# Patient Record
Sex: FEMALE
[De-identification: names, ages, dates, MRNs, and addresses within clinical notes are randomized per-mention and may not be internally consistent; named-entity substitution may affect disease eponyms.]

---

## 2018-11-13 PROBLEM — Z00.00 ENCOUNTER FOR PREVENTIVE HEALTH EXAMINATION: Status: ACTIVE | Noted: 2018-11-13

## 2018-11-23 ENCOUNTER — APPOINTMENT (OUTPATIENT)
Dept: ORTHOPEDIC SURGERY | Facility: CLINIC | Age: 48
End: 2018-11-23
Payer: OTHER MISCELLANEOUS

## 2018-11-23 VITALS
SYSTOLIC BLOOD PRESSURE: 115 MMHG | WEIGHT: 95 LBS | BODY MASS INDEX: 18.65 KG/M2 | HEART RATE: 81 BPM | HEIGHT: 60 IN | DIASTOLIC BLOOD PRESSURE: 82 MMHG

## 2018-11-23 DIAGNOSIS — Z72.3 LACK OF PHYSICAL EXERCISE: ICD-10-CM

## 2018-11-23 DIAGNOSIS — Z78.9 OTHER SPECIFIED HEALTH STATUS: ICD-10-CM

## 2018-11-23 DIAGNOSIS — M54.5 LOW BACK PAIN: ICD-10-CM

## 2018-11-23 DIAGNOSIS — M51.36 OTHER INTERVERTEBRAL DISC DEGENERATION, LUMBAR REGION: ICD-10-CM

## 2018-11-23 DIAGNOSIS — Z56.0 UNEMPLOYMENT, UNSPECIFIED: ICD-10-CM

## 2018-11-23 PROCEDURE — 72100 X-RAY EXAM L-S SPINE 2/3 VWS: CPT

## 2018-11-23 PROCEDURE — 99205 OFFICE O/P NEW HI 60 MIN: CPT

## 2018-11-23 SDOH — ECONOMIC STABILITY - INCOME SECURITY: UNEMPLOYMENT, UNSPECIFIED: Z56.0

## 2020-10-05 ENCOUNTER — APPOINTMENT (OUTPATIENT)
Dept: OBGYN | Facility: CLINIC | Age: 50
End: 2020-10-05
Payer: MEDICAID

## 2020-10-05 VITALS
DIASTOLIC BLOOD PRESSURE: 70 MMHG | BODY MASS INDEX: 17.47 KG/M2 | SYSTOLIC BLOOD PRESSURE: 110 MMHG | WEIGHT: 89 LBS | HEIGHT: 60 IN

## 2020-10-05 DIAGNOSIS — Z12.31 ENCOUNTER FOR SCREENING MAMMOGRAM FOR MALIGNANT NEOPLASM OF BREAST: ICD-10-CM

## 2020-10-05 DIAGNOSIS — R39.89 OTHER SYMPTOMS AND SIGNS INVOLVING THE GENITOURINARY SYSTEM: ICD-10-CM

## 2020-10-05 DIAGNOSIS — Z01.419 ENCOUNTER FOR GYNECOLOGICAL EXAMINATION (GENERAL) (ROUTINE) W/OUT ABNORMAL FINDINGS: ICD-10-CM

## 2020-10-05 LAB
BILIRUB UR QL STRIP: NORMAL
GLUCOSE UR-MCNC: NORMAL
HCG UR QL: 0.2 EU/DL
HGB UR QL STRIP.AUTO: NORMAL
KETONES UR-MCNC: NORMAL
LEUKOCYTE ESTERASE UR QL STRIP: NORMAL
NITRITE UR QL STRIP: NORMAL
PH UR STRIP: 6
PROT UR STRIP-MCNC: NORMAL
SP GR UR STRIP: 1.02

## 2020-10-05 PROCEDURE — 81003 URINALYSIS AUTO W/O SCOPE: CPT | Mod: QW

## 2020-10-05 PROCEDURE — 99386 PREV VISIT NEW AGE 40-64: CPT

## 2020-10-06 LAB
APPEARANCE: ABNORMAL
BACTERIA: NEGATIVE
BILIRUBIN URINE: NEGATIVE
BLOOD URINE: NORMAL
COLOR: NORMAL
GLUCOSE QUALITATIVE U: NEGATIVE
HPV HIGH+LOW RISK DNA PNL CVX: NOT DETECTED
HYALINE CASTS: 0 /LPF
KETONES URINE: NEGATIVE
LEUKOCYTE ESTERASE URINE: ABNORMAL
MICROSCOPIC-UA: NORMAL
NITRITE URINE: NEGATIVE
PH URINE: 6
PROTEIN URINE: NEGATIVE
RED BLOOD CELLS URINE: 6 /HPF
SPECIFIC GRAVITY URINE: 1.01
SQUAMOUS EPITHELIAL CELLS: 1 /HPF
UROBILINOGEN URINE: NORMAL
WHITE BLOOD CELLS URINE: 156 /HPF

## 2020-10-08 LAB — BACTERIA UR CULT: NORMAL

## 2020-11-20 ENCOUNTER — NON-APPOINTMENT (OUTPATIENT)
Age: 50
End: 2020-11-20

## 2020-12-23 PROBLEM — Z01.419 ENCOUNTER FOR ANNUAL ROUTINE GYNECOLOGICAL EXAMINATION: Status: RESOLVED | Noted: 2020-10-05 | Resolved: 2020-12-23

## 2021-01-05 ENCOUNTER — APPOINTMENT (OUTPATIENT)
Dept: OBGYN | Facility: CLINIC | Age: 51
End: 2021-01-05
Payer: COMMERCIAL

## 2021-01-05 VITALS
HEIGHT: 60 IN | SYSTOLIC BLOOD PRESSURE: 100 MMHG | DIASTOLIC BLOOD PRESSURE: 80 MMHG | RESPIRATION RATE: 18 BRPM | OXYGEN SATURATION: 98 % | WEIGHT: 85 LBS | BODY MASS INDEX: 16.69 KG/M2

## 2021-01-05 DIAGNOSIS — N90.5 ATROPHY OF VULVA: ICD-10-CM

## 2021-01-05 PROCEDURE — 99072 ADDL SUPL MATRL&STAF TM PHE: CPT

## 2021-01-05 PROCEDURE — 99213 OFFICE O/P EST LOW 20 MIN: CPT

## 2022-01-10 ENCOUNTER — TRANSCRIPTION ENCOUNTER (OUTPATIENT)
Age: 52
End: 2022-01-10

## 2022-01-18 ENCOUNTER — NON-APPOINTMENT (OUTPATIENT)
Age: 52
End: 2022-01-18

## 2022-01-18 ENCOUNTER — APPOINTMENT (OUTPATIENT)
Dept: CARDIOLOGY | Facility: CLINIC | Age: 52
End: 2022-01-18
Payer: MEDICAID

## 2022-01-18 VITALS
WEIGHT: 86 LBS | BODY MASS INDEX: 16.88 KG/M2 | DIASTOLIC BLOOD PRESSURE: 70 MMHG | HEIGHT: 60 IN | OXYGEN SATURATION: 98 % | HEART RATE: 75 BPM | SYSTOLIC BLOOD PRESSURE: 110 MMHG | RESPIRATION RATE: 16 BRPM

## 2022-01-18 PROCEDURE — 93000 ELECTROCARDIOGRAM COMPLETE: CPT

## 2022-01-18 PROCEDURE — 99204 OFFICE O/P NEW MOD 45 MIN: CPT

## 2022-01-18 RX ORDER — ESTRADIOL 0.1 MG/G
0.1 CREAM VAGINAL
Qty: 1 | Refills: 2 | Status: DISCONTINUED | COMMUNITY
Start: 2020-10-05 | End: 2022-01-18

## 2022-01-18 RX ORDER — LORATADINE 10 MG/1
10 TABLET ORAL
Qty: 10 | Refills: 0 | Status: ACTIVE | COMMUNITY
Start: 2022-01-07

## 2022-01-18 RX ORDER — ESTRADIOL 0.1 MG/G
0.1 CREAM VAGINAL
Refills: 0 | Status: ACTIVE | COMMUNITY

## 2022-01-18 NOTE — PHYSICAL EXAM
[Soft] : abdomen soft [Non Tender] : non-tender [Normal Gait] : normal gait [Normal] : no edema, no cyanosis, no clubbing, no varicosities [Moves all extremities] : moves all extremities [Alert and Oriented] : alert and oriented [Frail] : frail [de-identified] : Thin

## 2022-01-18 NOTE — HISTORY OF PRESENT ILLNESS
[FreeTextEntry1] : Patient is a 52yo F here for cardiac evaluation of chest pain. Started about a year ago. PAin on left side of chest. Sometimes worse when lays on left side. Sometimes pain is exertional as well, other times pain with deep breathing. No regular exercise, gets some mild dyspnea with walking longer distances or running. Patient denies PND/orthopnea/edema/palpitations/syncope/claudication. 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 Not on treatment, being monitored. \par \par Has grown kids, does house work. \par \par ROS: GI negative, all others negative

## 2022-01-18 NOTE — DISCUSSION/SUMMARY
[FreeTextEntry1] : Patient is a 52yo F here for cardiac evaluation of chest pain.\par -ECG with iRBBB but otherwise unremarkable\par -BP and exam unremarkable \par -Symptoms with typical and atypical features. will arrange cardiac work up to ensure no cardiac etiology or structural heart disease\par \par 1. Echo and EST to evaluate CP/SOB\par 2. Pulm follow up for sarcoid, low suspicion of cardiac involvement. Will monitor long term\par 3. Call with results, otherwise follow up 1 year\par 4. regular exercise as long as testing negative\par

## 2022-02-25 ENCOUNTER — APPOINTMENT (OUTPATIENT)
Dept: CARDIOLOGY | Facility: CLINIC | Age: 52
End: 2022-02-25
Payer: MEDICAID

## 2022-02-25 PROCEDURE — 93306 TTE W/DOPPLER COMPLETE: CPT

## 2022-02-25 PROCEDURE — 93015 CV STRESS TEST SUPVJ I&R: CPT

## 2022-03-17 ENCOUNTER — APPOINTMENT (OUTPATIENT)
Dept: CARDIOLOGY | Facility: CLINIC | Age: 52
End: 2022-03-17
Payer: MEDICAID

## 2022-03-17 VITALS
RESPIRATION RATE: 16 BRPM | BODY MASS INDEX: 16.88 KG/M2 | DIASTOLIC BLOOD PRESSURE: 72 MMHG | HEIGHT: 60 IN | WEIGHT: 86 LBS | OXYGEN SATURATION: 98 % | SYSTOLIC BLOOD PRESSURE: 110 MMHG | HEART RATE: 80 BPM

## 2022-03-17 DIAGNOSIS — R06.00 DYSPNEA, UNSPECIFIED: ICD-10-CM

## 2022-03-17 DIAGNOSIS — D86.0 SARCOIDOSIS OF LUNG: ICD-10-CM

## 2022-03-17 PROCEDURE — 99213 OFFICE O/P EST LOW 20 MIN: CPT

## 2022-03-17 NOTE — ASSESSMENT
[FreeTextEntry1] : \par \par ECHO 2/2022:\par 1. Normal LV size, systolic and diastolic function. EF 55-60%\par 2. Normal RV/LA/RA \par 3. Mild MR\par \par EST 2/2025:\par 1. Negative for ischemia\par 2. Normal HR/BP response \par 3. Achieved 9:40, 10 METS\par

## 2022-03-17 NOTE — PHYSICAL EXAM
[Frail] : frail [Soft] : abdomen soft [Non Tender] : non-tender [Normal Gait] : normal gait [Normal] : no edema, no cyanosis, no clubbing, no varicosities [Moves all extremities] : moves all extremities [Alert and Oriented] : alert and oriented [de-identified] : Thin

## 2022-03-17 NOTE — HISTORY OF PRESENT ILLNESS
[FreeTextEntry1] : Patient is a 50yo F here for cardiac follow up for CP. . Started about a year ago. PAin on left side of chest. Sometimes worse when lays on left side. Sometimes pain is exertional as well, other times pain with deep breathing. No regular exercise, gets some mild dyspnea with walking longer distances or running. Patient denies PND/orthopnea/edema/palpitations/syncope/claudication. 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 Not on treatment, being monitored. Patient underwent cardiac testing after last visit. \par \par Has grown kids, does house work. \par \par ROS: GI and  negative

## 2022-03-17 NOTE — DISCUSSION/SUMMARY
[FreeTextEntry1] : Patient is a 50yo F here for cardiac evaluation of chest pain.\par -ECG with iRBBB but otherwise unremarkable, no signs heart block \par -BP and exam unremarkable \par -Stress test 2/2022 negative, good functional capacity\par -Echo 2/2022 unremarkable, mild MR needs surveillance only and not clinically significant now\par \par \par 1. CV stable at this time, no further work up . GI eval for CP, getting EGD next month \par 2. Pulm follow up for sarcoid, low suspicion of cardiac involvement. Will monitor long term\par 3. Recommend aggressive diet and lifestyle modifications \par 4. regular exercise as tolerated \par 5. Annual follow up

## 2024-06-11 ENCOUNTER — NON-APPOINTMENT (OUTPATIENT)
Age: 54
End: 2024-06-11

## 2024-06-13 ENCOUNTER — APPOINTMENT (OUTPATIENT)
Dept: CARDIOLOGY | Facility: CLINIC | Age: 54
End: 2024-06-13
Payer: MEDICAID

## 2024-06-13 PROCEDURE — 93000 ELECTROCARDIOGRAM COMPLETE: CPT

## 2024-06-19 ENCOUNTER — APPOINTMENT (OUTPATIENT)
Dept: CARDIOLOGY | Facility: CLINIC | Age: 54
End: 2024-06-19
Payer: MEDICAID

## 2024-06-19 VITALS
BODY MASS INDEX: 16.41 KG/M2 | HEART RATE: 78 BPM | SYSTOLIC BLOOD PRESSURE: 126 MMHG | RESPIRATION RATE: 15 BRPM | WEIGHT: 84 LBS | DIASTOLIC BLOOD PRESSURE: 70 MMHG

## 2024-06-19 DIAGNOSIS — R07.9 CHEST PAIN, UNSPECIFIED: ICD-10-CM

## 2024-06-19 DIAGNOSIS — Z82.49 FAMILY HISTORY OF ISCHEMIC HEART DISEASE AND OTHER DISEASES OF THE CIRCULATORY SYSTEM: ICD-10-CM

## 2024-06-19 PROCEDURE — 99214 OFFICE O/P EST MOD 30 MIN: CPT

## 2024-06-19 PROCEDURE — G2211 COMPLEX E/M VISIT ADD ON: CPT | Mod: NC,1L

## 2024-06-19 RX ORDER — MELOXICAM 15 MG/1
15 TABLET ORAL
Refills: 0 | Status: ACTIVE | COMMUNITY

## 2024-06-19 RX ORDER — SUMATRIPTAN 100 MG/1
100 TABLET, FILM COATED ORAL
Qty: 9 | Refills: 0 | Status: ACTIVE | COMMUNITY
Start: 2021-10-26

## 2024-06-19 RX ORDER — NAPROXEN 500 MG/1
500 TABLET ORAL
Qty: 30 | Refills: 0 | Status: DISCONTINUED | COMMUNITY
Start: 2022-01-07 | End: 2024-06-19

## 2024-06-19 RX ORDER — FAMOTIDINE 20 MG/1
20 TABLET, FILM COATED ORAL
Qty: 60 | Refills: 0 | Status: DISCONTINUED | COMMUNITY
Start: 2021-10-26 | End: 2024-06-19

## 2024-06-19 NOTE — ASSESSMENT
[FreeTextEntry1] :   ECHO 2/2022: 1. Normal LV size, systolic and diastolic function. EF 55-60% 2. Normal RV/LA/RA  3. Mild MR  EST 2/2022: 1. Negative for ischemia 2. Normal HR/BP response  3. Achieved 9:40, 10 METS

## 2024-06-19 NOTE — HISTORY OF PRESENT ILLNESS
[FreeTextEntry1] : Patient is a 53yo F here for cardiac follow up for CP. Came in to office last week and ECG unremarkable. Was in ED with SOB/CP/cough, work up negative. HAd been noting cough and sneezing. Pain worse with coughing and laying on left side. Pain was on right side of chest, worse with breathing. No fevers/chills/sweats. NO exertional symptoms. THinks pain felt different than when had pain with cough as well. Pain worse with certain positions as well. Noting pain in right shoulder. Patient denies PND/orthopnea/edema/palpitations/syncope/claudication   Has grown kids, does house work.   ROS: GI and  negative

## 2024-06-19 NOTE — DISCUSSION/SUMMARY
[FreeTextEntry1] : Patient is a 55yo F with family history CAD here for cardiac evaluation of chest pain. -ECG done when having pain unremarkable  -Stress test 2/2022 negative, good functional capacity -Echo 2/2022 unremarkable, mild MR needs surveillance only  -CP non-cardiac, partly muscular and component was pleuritic from URI. BEtter now. No further cardiac work up at this time -Family history suggests CMP in mother, ? MI in father. Testing negative in 2022  1. CV stable, continue aggressive risk factor modification  2. Recommend aggressive diet and lifestyle modifications  3. Recommend 30 minutes moderate intensity aerobic activity 5 days per week  4. Close PMD follow up 5. Follow up 1 year

## 2024-06-19 NOTE — PHYSICAL EXAM
[Frail] : frail [Soft] : abdomen soft [Non Tender] : non-tender [Normal Gait] : normal gait [Normal] : no edema, no cyanosis, no clubbing, no varicosities [Moves all extremities] : moves all extremities [Alert and Oriented] : alert and oriented [de-identified] : Thin